# Patient Record
Sex: FEMALE | Race: WHITE
[De-identification: names, ages, dates, MRNs, and addresses within clinical notes are randomized per-mention and may not be internally consistent; named-entity substitution may affect disease eponyms.]

---

## 2017-06-23 NOTE — RADRPT
EXAM DATE/TIME:  06/23/2017 11:52 

 

[facility] COMPARISON:     

No previous studies available for comparison.

 

INDICATIONS :     

Pelvic/vaginal mass. 

      

     .  

 

FINDINGS:     

 

 

 

 

Mrs. Dowd came in for biopsy of the vaginal mass.  She currently takes 30 mg of morphine today for pa
in.  After long discussion I reschedule her for Monday with anesthesia sedation. Javad Pozo MD 
FACR on June 23, 2017 at 14:01           

Board Certified Radiologist.

 This report was verified electronically.

## 2017-06-26 NOTE — RADRPT
EXAM DATE/TIME:  06/26/2017 12:45 

 

[facility] COMPARISON:     

No previous studies available for comparison.

 

 

 

INDICATIONS :      

Mass.

                     

 

 

 

 

BIOPSY SITE:       

Right vaginal oriface/perineal region

                     

Anesthesia and pain control was provided by the Anesthesia department.

Prophylactic antibiotics were administered with appropriate pre-procedure timing.

Vancomycin within 2 hrs of procedure, Ancef (or alternative) within 1 hr of procedure start.

Intra-procedural antibiotics were given as prescribed above.

 

 

DEVICE(S):

1.) 18 gauge Leslie blunt needle 

2.) 20 gauge Temno core biopsy needle 

                     

 

MEDICAL HISTORY :     

Hepatitis C.   cevical cancer, myeloid leukemia, multiple sclerosis

 

SURGICAL HISTORY :     

Hysterectomy. Appendectomy.  

 

ENCOUNTER:     

Initial

 

ACUITY:     

1 day

 

PAIN SCORE:     

0/10

 

LOCATION:     

Bilateral pelvis

                     

A total of three core specimen(s) were obtained and sent to the laboratory for pathologic evaluation.


 

 

PROCEDURE:

1.   CT guided pelvic biopsy.

3.   EKG and oximetry remained stable throughout the procedure.

 

Prior to the procedure informed consent was obtained.  Any appropriate prior imaging studies were rev
iewed. 

Using automated exposure control and adjustment of the mA and/or kV according to patient size, radiat
ion dose was kept as low as reasonably achievable to obtain optimal diagnostic quality images.  DICOM
 format image data is available electronically for review and comparison.   

 

 

The site was prepped in a sterile fashion.  Full sterile technique was used, including cap, mask, jim
rile gloves and gown and a large sterile sheet.  Hand hygiene and 2% chlorhexidine and/or betadine/al
cohol prep was utilized per protocol for cutaneous antisepsis.  The skin and subcutaneous tissues wer
e infiltrated with local anesthetic solution.

 

Under CT guidance an 18 gauge blunt needle was placed down to the PET positive mass may prone posteri
or approach.  3 cores were obtained.  One slide was submitted.

Follow-up CT scan reveals no hemorrhage.

 

The patient tolerated the procedure well and there were no complications. The patient was returned to
 the Radiology Outpatient Unit in stable condition.

 

 

 

 

CONCLUSION:     Uncomplicated CT guided biopsy PET positive perivaginal soft tissue mass in this becky
ent with history of cervical carcinoma.  Diagnosed used to minimize needle  tracking.   Javad clemons MD FACR on June 26, 2017 at 13:43           

Board Certified Radiologist.

 This report was verified electronically.

## 2018-02-23 NOTE — EKG
Date Performed: 02/22/2018       Time Performed: 11:56:05

 

PTAGE:      57 years

 

EKG:      Sinus rhythm 

 

 NORMAL ECG

 

PREVIOUS TRACING       : 10/11/2016 11.53 Since the prior tracing, there has been no significant roldan


 

DOCTOR:   Shruthi Avery  Interpretating Date/Time  02/23/2018 14:32:48

## 2018-03-06 ENCOUNTER — HOSPITAL ENCOUNTER (OUTPATIENT)
Dept: HOSPITAL 17 - HSDC | Age: 58
Discharge: HOME | End: 2018-03-06
Attending: OBSTETRICS & GYNECOLOGY
Payer: MEDICAID

## 2018-03-06 VITALS — HEIGHT: 63 IN | WEIGHT: 120.15 LBS | BODY MASS INDEX: 21.29 KG/M2

## 2018-03-06 VITALS
OXYGEN SATURATION: 98 % | SYSTOLIC BLOOD PRESSURE: 128 MMHG | DIASTOLIC BLOOD PRESSURE: 76 MMHG | HEART RATE: 86 BPM | RESPIRATION RATE: 18 BRPM | TEMPERATURE: 97.8 F

## 2018-03-06 DIAGNOSIS — C53.9: Primary | ICD-10-CM

## 2018-03-06 DIAGNOSIS — Z92.21: ICD-10-CM

## 2018-03-06 DIAGNOSIS — N76.6: ICD-10-CM

## 2018-03-06 DIAGNOSIS — Z92.3: ICD-10-CM

## 2018-03-06 DIAGNOSIS — C51.9: ICD-10-CM

## 2018-03-06 PROCEDURE — 88305 TISSUE EXAM BY PATHOLOGIST: CPT

## 2018-03-06 PROCEDURE — 86901 BLOOD TYPING SEROLOGIC RH(D): CPT

## 2018-03-06 PROCEDURE — 86900 BLOOD TYPING SEROLOGIC ABO: CPT

## 2018-03-06 PROCEDURE — 00940 ANES VAGINAL PX NOS: CPT

## 2018-03-06 PROCEDURE — 86850 RBC ANTIBODY SCREEN: CPT

## 2018-03-06 PROCEDURE — 56605 BIOPSY OF VULVA/PERINEUM: CPT

## 2018-03-06 PROCEDURE — 56606 BIOPSY OF VULVA/PERINEUM: CPT

## 2018-03-06 NOTE — MP
cc:

Chanel Hearn MD, Michael DO McNish,Desirae Coello,Finn CUELLO MD

****

 

 

DATE OF OPERATION:

03/06/2018

 

PREOPERATIVE DIAGNOSES:

1.  Locally advanced cervix cancer, status post radiation and 

chemotherapy.

2.  Locally advanced vulvar carcinoma (versus recurrent cervix 

cancer), status post radiation and chemotherapy.

3.  Painful, ulcerative area on the vulva with firm prominence in the 

right vulva.

 

POSTOPERATIVE DIAGNOSES:

1.  Locally advanced cervix cancer, status post radiation and 

chemotherapy.

2.  Locally advanced vulvar carcinoma (versus recurrent cervix 

cancer), status post radiation and chemotherapy.

3.  Painful, ulcerative area on the vulva with firm prominence in the 

right vulva.

 

PROCEDURE:

Examination under anesthesia, Alpesh-Cut needle biopsies x 2 plus 

multifocal 3 mm dermal punch biopsies x 3.

 

SURGEON:

Chanel Hearn MD

 

FIRST ASSISTANT:

Naheed first assistant.

 

ANESTHESIA:

Laryngeal mask anesthesia.

 

ESTIMATED BLOOD LOSS:

Less than 10 mL.

 

HISTORY:

A 57-year-old female who had locally advanced cervical cancer, treated

with radiation and Cisplatin chemotherapy.  Did well for a period of 

time and then she had tumor develop in the right distal vagina/vulva. 

Squamous cell carcinoma was confirmed on biopsy.  She was treated with

radiation to this area and platinum chemotherapy.

 

Recent PET scan showed no evidence of disease.  Recent exam shows 

altered anatomy.  It is uncertain if it is persistent recurrent tumor 

versus post-radiation anatomical changes.

 

She has the impression that there is a prominence in the right 

posterior vulva that is becoming more prominent.  She is naturally 

concerned because this was the area where the tumor at the introitus 

was first recognized.  She was seen recently in the office.  She was 

in favor of biopsies; however, did not believe she could tolerate 

biopsies in the office.

 

She was counseled regarding exam under anesthesia, multifocal 

biopsies.  She is seen in the preop holding area where we again 

counseled her regarding these findings, recommendations, and planned 

procedure.  She expresses good understanding and would like to move 

forward as recommended.

 

FINDINGS:

The findings are as described in recent notes, but basically there are

diffuse radiation changes, the cervix is no longer visible, as it is 

flush with the vaginal apex and the vaginal apex is obliterated, but 

the mucosa that is palpable and visible is smooth, although there is 

fairly moderate to severe atrophic changes throughout.

 

There is no appreciable inguinal adenopathy.  External genitalia is 

notable for very thickened skin and mucosa, post-radiation changes.  

There is an ulcerated area, approximately 1.5 cm, maybe 2 cm at most 

in greatest dimension at the introitus at the 7 o'clock position.  The

firmness about which she is concerned is in the vulva 7 o'clock 

position on the right side, but the overlying skin appears normal in 

that there is no ulceration, no mass, but there is a firmness there, 

induration, about which she is concerned as well.

 

The remainder of the mucosa is inspected including the periurethral 

area that appeared erythematous in clinic, but is very smooth.  There 

is no mass or nodularity.  There is no other mass effect, skin 

breakdown, or suspicious changes elsewhere and the anatomy that is 

most deviated from normal is in the region of the posterior right 

vulva, right introitus, and distal vagina and it is this region where 

biopsies are concentrated.

 

DESCRIPTION OF PROCEDURE:

She is taken to the operating room and placed in the dorsal lithotomy 

position after laryngeal mask anesthesia was administered.  Time-out 

was undertaken, she was identified by sight recognition and hospital 

ID bracelet, and the proposed procedure was reviewed and confirmed.

 

Given her recent foot injury, her right foot and ankle are packed in 

foam padding, stabilizing the position at the normal right angle (in 

which she was wearing her protective boot).  Her foot was placed in 

stirrup in that position and padded as described.  Left foot also 

placed in stirrups.  She was placed in the lithotomy position.

 

Exam under anesthesia was performed, findings as described above.  She

was prepped, draped in sterile fashion.  In-and-out catheterization of

the bladder was performed.

 

The sites that appeared most abnormal and representative for biopsies 

were marked with a surgical marker.  Lidocaine and epinephrine was 

injected in these areas and 5 sites were chosen to obtain biopsies.

 

Dermal punch biopsies were performed and labeled #1 distal introitus 7

o'clock; #2 proximal introitus 7 o'clock; specimen #4 was also punch 

biopsy posterior distal vagina 6 o'clock.

 

Now, a Alpesh-Cut needle biopsy was performed which corresponded to 

specimen #3 right vulva 7 o'clock and specimen #5 which was right 

introitus 8 o'clock.  These last 2 specimens were Alpesh-Cut needle 

biopsies with several passes, obtaining satisfactory amount of tissue.

 

All sites were inspected.  The bases were rendered hemostatic with 

Monsel solution and then figure-of-eight 3-0 Vicryl sutures were 

placed over each biopsy site, rendering them completely hemostatic 

with skin and/or mucosa well approximated.

 

Exam confirmed there were no remaining foreign objects in the vagina. 

Preliminary and final counts were correct.  She was returned to dorsal

supine position and was pending reversal of anesthesia when I left the

operating room to precede her to the post-anesthesia care unit.

 

 

__________________________________

Chanel Hearn MD

 

 

KLM/TI

D: 03/06/2018, 03:36 PM

T: 03/06/2018, 04:03 PM

Visit #: [id number]

Job #: [id number]

## 2018-05-01 ENCOUNTER — HOSPITAL ENCOUNTER (OUTPATIENT)
Dept: HOSPITAL 17 - HSDC | Age: 58
Discharge: HOME | End: 2018-05-01
Attending: OBSTETRICS & GYNECOLOGY
Payer: MEDICAID

## 2018-05-01 VITALS
DIASTOLIC BLOOD PRESSURE: 83 MMHG | TEMPERATURE: 98 F | HEART RATE: 80 BPM | OXYGEN SATURATION: 97 % | SYSTOLIC BLOOD PRESSURE: 157 MMHG | RESPIRATION RATE: 18 BRPM

## 2018-05-01 DIAGNOSIS — N76.6: Primary | ICD-10-CM

## 2018-05-01 DIAGNOSIS — Z92.21: ICD-10-CM

## 2018-05-01 DIAGNOSIS — L59.8: ICD-10-CM

## 2018-05-01 DIAGNOSIS — Z85.41: ICD-10-CM

## 2018-05-01 DIAGNOSIS — Z92.3: ICD-10-CM

## 2018-05-01 DIAGNOSIS — F17.210: ICD-10-CM

## 2018-05-01 DIAGNOSIS — R10.2: ICD-10-CM

## 2018-05-01 PROCEDURE — 86901 BLOOD TYPING SEROLOGIC RH(D): CPT

## 2018-05-01 PROCEDURE — 88304 TISSUE EXAM BY PATHOLOGIST: CPT

## 2018-05-01 PROCEDURE — 11043 DBRDMT MUSC&/FSCA 1ST 20/<: CPT

## 2018-05-01 PROCEDURE — 86850 RBC ANTIBODY SCREEN: CPT

## 2018-05-01 PROCEDURE — 57410 PELVIC EXAMINATION: CPT

## 2018-05-01 PROCEDURE — 00940 ANES VAGINAL PX NOS: CPT

## 2018-05-01 PROCEDURE — 88305 TISSUE EXAM BY PATHOLOGIST: CPT

## 2018-05-01 PROCEDURE — 86900 BLOOD TYPING SEROLOGIC ABO: CPT

## 2018-05-02 NOTE — MP
cc:

Chanel Hearn MD,Finn Mcgovern,Desirae Anaya,Miguel HAN

****

 

 

DATE OF OPERATION:

05/01/2018

 

DATE OF PROCEDURE:

05/01/2018.

 

PREOPERATIVE DIAGNOSES:

1.  History of locally advanced cervix cancer status post radiation 

and chemotherapy.

2.  Locally advanced vulvar cancer status post radiation and 

chemotherapy.

3.  Distal vaginal and vulvar ulcerations, nonhealing, pain, 

discharge.

 

POSTOPERATIVE DIAGNOSES:

1.  History of locally advanced cervix cancer status post radiation 

and chemotherapy.

2.  Locally advanced vulvar cancer status post radiation and 

chemotherapy.

3.  Distal vaginal and vulvar ulcerations, nonhealing, pain, 

discharge.

 

PROCEDURE:

Examination under anesthesia, sharp debridement of necrotic tissue 

from the vulva, perineum and distal vagina.

 

SURGEON:

Chanel Hearn MD

 

ASSISTANT:

Naheed cleary assistant.

 

ANESTHESIA:

Laryngeal mask anesthesia.

 

ESTIMATED BLOOD LOSS:

40 mL

 

HISTORY:

A 57-year-old female with a history of locally advanced squamous cell 

carcinoma of the cervix treated with pelvic radiation and 

chemotherapy.  She did well without evidence of disease and continues 

to have no evidence of disease in the region of the cervix.

 

She developed a mass at the right introitus at the 7 o'clock position 

at the junction of the vagina and the vulva.  Biopsy showed squamous 

cell carcinoma.  It was remote from the cervix and was thought most 

consistent with a second primary tumor.

 

This was treated with radiation and cisplatin chemotherapy.  She had a

complete response to treatment.

 

Within recent weeks, she was taken for examination under anesthesia 

where multifocal biopsies were performed.  All biopsies showed chronic

inflammation and necrosis, radiation changes, but no evidence of 

active tumor.

 

She continues to have persistent pain at times debilitating with 

serous, sometimes sanguineous discharge.  She is under pain 

management's care, but her symptoms persist.

 

She was seen and evaluated in the office, where we were offered 

another exam under anesthesia with sharp debridement to try to remove 

the necrotic tissue, to cut it back to healthy tissue to see if that 

can help stimulate healing and any tissue removed would be sent again 

for histopathologic analysis.

 

She agreed, was in favor of this.  She was seen again in the preop 

holding area where the findings and plan of care are again discussed 

and reviewed.  She understands and wishes to move forward.

 

FINDINGS:

As previously described, there is ulcerative nonhealing tissue at 

introitus starting from approximately the 3 o'clock position on the 

left vulva, extending clockwise across the perineum to the 10 o'clock 

position on the right vulva and extending into the distal vagina.  

There is some attenuation of the perineal body due to tissue necrosis 

with a central ulceration just proximal to the anal sphincter in the 

perineum/distal vagina.

 

The necrotic ulcerative tissue was somewhat diffuse and contiguous.  

There is no area that looks overtly like recurrent disease.

 

PROCEDURE:

She was taken to the operating room and placed in dorsal lithotomy 

position.  After general endotracheal anesthesia was administered, 

timeout was undertaken.  She was identified by site recognition and 

hospital ID braamy and the proposed procedure was reviewed and 

confirmed.

 

Exam under anesthesia was performed with findings as described above. 

She was prepped and draped in sterile fashion.  Lidocaine with 

epinephrine was injected to all areas that appeared abnormal.

 

Sharp debridement with the scissors was used to excise the necrotic 

tissue until all areas of the surface necrotic tissue had been removed

and the tissue was showing evidence of bleeding throughout.

 

Then, a sharp curet was used for further curetting to remove any 

residual necrotic tissue and now the base was all showing active 

bleeding and it was felt that all reasonable surgical objectives had 

been completed.

 

Pressure was held on the defect which was showing good blood supply 

until Surgicel SNoW product was placed in the defect, which rendered 

it hemostatic.

 

There were no remaining foreign objects in the vagina other than the 

intentionally placed hemostatic agent.  Preliminary and final counts 

were correct.  She was returned to dorsal supine position and was 

pending reversal of anesthesia when I left the operating room to 

precede her to the Postanesthesia Care Unit.

 

 

__________________________________

MD BASSEM Mccullough/THEO

D: 05/02/2018, 02:44 PM

T: 05/02/2018, 03:15 PM

Visit #: [id number]

Job #: [id number]